# Patient Record
Sex: FEMALE | Race: WHITE | NOT HISPANIC OR LATINO | URBAN - METROPOLITAN AREA
[De-identification: names, ages, dates, MRNs, and addresses within clinical notes are randomized per-mention and may not be internally consistent; named-entity substitution may affect disease eponyms.]

---

## 2018-11-28 ENCOUNTER — OFFICE VISIT (OUTPATIENT)
Dept: URBAN - METROPOLITAN AREA CLINIC 23 | Facility: CLINIC | Age: 68
End: 2018-11-28
Payer: MEDICARE

## 2018-11-28 DIAGNOSIS — H25.811 COMBINED FORMS OF AGE-RELATED CATARACT, RIGHT EYE: Primary | ICD-10-CM

## 2018-11-28 DIAGNOSIS — H43.812 VITREOUS DEGENERATION, LEFT EYE: ICD-10-CM

## 2018-11-28 PROCEDURE — 92012 INTRM OPH EXAM EST PATIENT: CPT | Performed by: OPTOMETRIST

## 2018-11-28 ASSESSMENT — INTRAOCULAR PRESSURE
OS: 12
OD: 12

## 2018-11-28 ASSESSMENT — KERATOMETRY
OS: 43.13
OD: 43.13

## 2018-11-28 ASSESSMENT — VISUAL ACUITY: OD: 20/20

## 2018-11-28 NOTE — IMPRESSION/PLAN
Impression: Vitreous degeneration, left eye: H43.962. Plan: Discussed findings. Signs and symptoms of retinal detachment and tear discussed. Recommend monitoring in one month if symptomatic.  Sooner PRN

## 2020-11-06 ENCOUNTER — OFFICE VISIT (OUTPATIENT)
Dept: URBAN - METROPOLITAN AREA CLINIC 23 | Facility: CLINIC | Age: 70
End: 2020-11-06
Payer: MEDICARE

## 2020-11-06 PROCEDURE — 92014 COMPRE OPH EXAM EST PT 1/>: CPT | Performed by: OPTOMETRIST

## 2020-11-06 ASSESSMENT — VISUAL ACUITY: OD: 20/20

## 2020-11-06 ASSESSMENT — KERATOMETRY
OD: 43.00
OS: 43.25

## 2020-11-06 ASSESSMENT — INTRAOCULAR PRESSURE
OD: 14
OS: 14